# Patient Record
Sex: FEMALE | HISPANIC OR LATINO | Employment: UNEMPLOYED | URBAN - METROPOLITAN AREA
[De-identification: names, ages, dates, MRNs, and addresses within clinical notes are randomized per-mention and may not be internally consistent; named-entity substitution may affect disease eponyms.]

---

## 2018-11-28 ENCOUNTER — APPOINTMENT (OUTPATIENT)
Dept: RADIOLOGY | Facility: MEDICAL CENTER | Age: 33
End: 2018-11-28

## 2018-11-28 ENCOUNTER — APPOINTMENT (OUTPATIENT)
Dept: RADIOLOGY | Facility: MEDICAL CENTER | Age: 33
End: 2018-11-28
Attending: EMERGENCY MEDICINE

## 2018-11-28 ENCOUNTER — HOSPITAL ENCOUNTER (EMERGENCY)
Facility: MEDICAL CENTER | Age: 33
End: 2018-11-28
Attending: EMERGENCY MEDICINE

## 2018-11-28 VITALS
HEART RATE: 91 BPM | OXYGEN SATURATION: 99 % | DIASTOLIC BLOOD PRESSURE: 71 MMHG | HEIGHT: 64 IN | TEMPERATURE: 98.2 F | SYSTOLIC BLOOD PRESSURE: 98 MMHG | BODY MASS INDEX: 23.34 KG/M2 | WEIGHT: 136.69 LBS | RESPIRATION RATE: 18 BRPM

## 2018-11-28 DIAGNOSIS — R07.9 CHEST PAIN, UNSPECIFIED TYPE: ICD-10-CM

## 2018-11-28 LAB
ALBUMIN SERPL BCP-MCNC: 3.9 G/DL (ref 3.2–4.9)
ALBUMIN/GLOB SERPL: 1.3 G/DL
ALP SERPL-CCNC: 71 U/L (ref 30–99)
ALT SERPL-CCNC: 11 U/L (ref 2–50)
ANION GAP SERPL CALC-SCNC: 7 MMOL/L (ref 0–11.9)
APTT PPP: 29 SEC (ref 24.7–36)
AST SERPL-CCNC: 15 U/L (ref 12–45)
BASOPHILS # BLD AUTO: 0.5 % (ref 0–1.8)
BASOPHILS # BLD: 0.05 K/UL (ref 0–0.12)
BILIRUB SERPL-MCNC: 0.3 MG/DL (ref 0.1–1.5)
BNP SERPL-MCNC: 3 PG/ML (ref 0–100)
BUN SERPL-MCNC: 11 MG/DL (ref 8–22)
CALCIUM SERPL-MCNC: 9.1 MG/DL (ref 8.5–10.5)
CHLORIDE SERPL-SCNC: 107 MMOL/L (ref 96–112)
CO2 SERPL-SCNC: 24 MMOL/L (ref 20–33)
CREAT SERPL-MCNC: 0.75 MG/DL (ref 0.5–1.4)
EKG IMPRESSION: NORMAL
EOSINOPHIL # BLD AUTO: 0.09 K/UL (ref 0–0.51)
EOSINOPHIL NFR BLD: 0.9 % (ref 0–6.9)
ERYTHROCYTE [DISTWIDTH] IN BLOOD BY AUTOMATED COUNT: 40.6 FL (ref 35.9–50)
GLOBULIN SER CALC-MCNC: 3 G/DL (ref 1.9–3.5)
GLUCOSE SERPL-MCNC: 91 MG/DL (ref 65–99)
HCG SERPL QL: NEGATIVE
HCT VFR BLD AUTO: 38.1 % (ref 37–47)
HGB BLD-MCNC: 12.8 G/DL (ref 12–16)
IMM GRANULOCYTES # BLD AUTO: 0.03 K/UL (ref 0–0.11)
IMM GRANULOCYTES NFR BLD AUTO: 0.3 % (ref 0–0.9)
INR PPP: 1.08 (ref 0.87–1.13)
LIPASE SERPL-CCNC: 24 U/L (ref 11–82)
LYMPHOCYTES # BLD AUTO: 2.48 K/UL (ref 1–4.8)
LYMPHOCYTES NFR BLD: 24.1 % (ref 22–41)
MCH RBC QN AUTO: 30.7 PG (ref 27–33)
MCHC RBC AUTO-ENTMCNC: 33.6 G/DL (ref 33.6–35)
MCV RBC AUTO: 91.4 FL (ref 81.4–97.8)
MONOCYTES # BLD AUTO: 0.49 K/UL (ref 0–0.85)
MONOCYTES NFR BLD AUTO: 4.8 % (ref 0–13.4)
NEUTROPHILS # BLD AUTO: 7.17 K/UL (ref 2–7.15)
NEUTROPHILS NFR BLD: 69.4 % (ref 44–72)
NRBC # BLD AUTO: 0 K/UL
NRBC BLD-RTO: 0 /100 WBC
PLATELET # BLD AUTO: 499 K/UL (ref 164–446)
PMV BLD AUTO: 9 FL (ref 9–12.9)
POTASSIUM SERPL-SCNC: 3.5 MMOL/L (ref 3.6–5.5)
PROT SERPL-MCNC: 6.9 G/DL (ref 6–8.2)
PROTHROMBIN TIME: 14.1 SEC (ref 12–14.6)
RBC # BLD AUTO: 4.17 M/UL (ref 4.2–5.4)
SODIUM SERPL-SCNC: 138 MMOL/L (ref 135–145)
TROPONIN I SERPL-MCNC: <0.01 NG/ML (ref 0–0.04)
WBC # BLD AUTO: 10.3 K/UL (ref 4.8–10.8)

## 2018-11-28 PROCEDURE — 99284 EMERGENCY DEPT VISIT MOD MDM: CPT

## 2018-11-28 PROCEDURE — 85025 COMPLETE CBC W/AUTO DIFF WBC: CPT

## 2018-11-28 PROCEDURE — 80053 COMPREHEN METABOLIC PANEL: CPT

## 2018-11-28 PROCEDURE — 84484 ASSAY OF TROPONIN QUANT: CPT

## 2018-11-28 PROCEDURE — 84703 CHORIONIC GONADOTROPIN ASSAY: CPT

## 2018-11-28 PROCEDURE — 85610 PROTHROMBIN TIME: CPT

## 2018-11-28 PROCEDURE — 83690 ASSAY OF LIPASE: CPT

## 2018-11-28 PROCEDURE — 36415 COLL VENOUS BLD VENIPUNCTURE: CPT

## 2018-11-28 PROCEDURE — 700102 HCHG RX REV CODE 250 W/ 637 OVERRIDE(OP): Performed by: EMERGENCY MEDICINE

## 2018-11-28 PROCEDURE — 93005 ELECTROCARDIOGRAM TRACING: CPT | Performed by: EMERGENCY MEDICINE

## 2018-11-28 PROCEDURE — A9270 NON-COVERED ITEM OR SERVICE: HCPCS | Performed by: EMERGENCY MEDICINE

## 2018-11-28 PROCEDURE — 85730 THROMBOPLASTIN TIME PARTIAL: CPT

## 2018-11-28 PROCEDURE — 83880 ASSAY OF NATRIURETIC PEPTIDE: CPT

## 2018-11-28 PROCEDURE — 71045 X-RAY EXAM CHEST 1 VIEW: CPT

## 2018-11-28 RX ORDER — OMEPRAZOLE 20 MG/1
20 CAPSULE, DELAYED RELEASE ORAL DAILY
Qty: 30 CAP | Refills: 0 | Status: SHIPPED | OUTPATIENT
Start: 2018-11-28

## 2018-11-28 RX ADMIN — LIDOCAINE HYDROCHLORIDE 30 ML: 20 SOLUTION OROPHARYNGEAL at 18:34

## 2018-11-28 ASSESSMENT — PAIN SCALES - GENERAL
PAINLEVEL_OUTOF10: 9
PAINLEVEL_OUTOF10: 2

## 2018-11-28 ASSESSMENT — LIFESTYLE VARIABLES: DO YOU DRINK ALCOHOL: NO

## 2018-11-29 NOTE — ED NOTES
Pt discharged at this time with family. Pt verbalized understanding of discharge instructions and follow up at this time. Pt ambulated out with steady gait.

## 2018-11-29 NOTE — ED PROVIDER NOTES
ED Provider Note    CHIEF COMPLAINT  Chief Complaint   Patient presents with   • Chest Pain       HPI  Amaya Fox is a 33 y.o. female who presents for evaluation of chest pain which started around 10:00 this morning.  Patient states the pain is in her inferior sternal area and radiates to her back.  It does not radiate to her jaw or arm, and it is not associated with sweating, nausea, or vomiting.  Patient notes she has had this several times in the past few years but does not normally see a doctor and has not been diagnosed.  She is on no medications at this time aside from omeprazole and occasional Motrin.    REVIEW OF SYSTEMS  Constitutional: No fevers or chills  Skin: No rashes  HEENT: No sore throat, runny nose, sores, trouble swallowing, trouble speaking.  Neck: No neck pain, stiffness, or masses.  Chest: No rashes  Pulm: No shortness of breath, cough, wheezing, stridor, or pain with inspiration/expiration  Gastrointestinal: No nausea, vomiting, diarrhea, constipation  Genitourinary: No dysuria or hematuria  Musculoskeletal: No recent trauma, pain, swelling, weakness  Heme: No bleeding or bruising problems.   Immuno: No hx of recurrent infections      PAST MEDICAL HISTORY   has a past medical history of Abnormal Pap smear - Colpo/Bx 2/10/10 (2010); Anemia (2010); ASTHMA; History of  x2 (2010); History of drug abuse (2010); and SUPERVISION OF HIGH-RISK PREGNANCY (2010).    SOCIAL HISTORY  Social History     Social History Main Topics   • Smoking status: Current Every Day Smoker     Packs/day: 1.50     Last attempt to quit: 2009   • Smokeless tobacco: Never Used   • Alcohol use No   • Drug use: No      Comment: Not since    • Sexual activity: Yes     Partners: Male       SURGICAL HISTORY   has a past surgical history that includes c-sec+postpartum care,prev c-sec; primary c section; repeat c section w tubal ligation (2010);  delivery only; tubal ligation;  "and jaki by laparoscopy (8/19/2010).    CURRENT MEDICATIONS  Home Medications     Reviewed by Hilary Delcid R.N. (Registered Nurse) on 11/28/18 at 1648  Med List Status: Not Addressed   Medication Last Dose Status   ibuprofen (MOTRIN) 600 MG Tab  Active   omeprazole (PRILOSEC) 20 MG delayed-release capsule  Active                ALLERGIES  No Known Allergies    PHYSICAL EXAM  VITAL SIGNS: BP (!) 98/71   Pulse 91   Temp 36.8 °C (98.2 °F) (Temporal)   Resp 18   Ht 1.626 m (5' 4\")   Wt 62 kg (136 lb 11 oz)   SpO2 99%   BMI 23.46 kg/m²    Gen: Alert in no apparent distress.  Calm, smiling  HEENT: No signs of trauma, Bilateral external ears normal, Nose normal. Conjunctiva normal, Non-icteric.   Neck:  No tenderness, Supple, No masses  Lymphatic: No cervical lymphadenopathy noted.   Cardiovascular: Regular rate and rhythm, no murmurs.  Capillary refill less than 3 seconds to all extremities, 2+ distal pulses to radial and dorsalis pedis arteries, skin warm and dry to extremities  Thorax & Lungs: Normal breath sounds, No respiratory distress, No wheezing bilateral chest rise  Abdomen: Bowel sounds normal, Soft, No tenderness, No masses, No pulsatile masses. No Guarding or rebound  Skin: Warm, Dry, No erythema, No rash.   Extremities: Intact distal pulses, No edema  Neurologic: Alert , no facial droop, grossly normal coordination and strength  Psychiatric: Affect normal, Judgment normal, Mood normal.       INITIAL IMPRESSION  Patient has symptoms suggestive of gastric or esophageal pain however will attempt to rule out ACS as cause.  Patient has no risk factors for pulmonary embolism and is noted to be PERC negative.  She does not appear septic or toxic and has no recent infectious prodrome.  She has had no recent symptoms to suggest gastrointestinal bleeding and has no abdominal pain to suggest perforation.    LABS  Results for orders placed or performed during the hospital encounter of 11/28/18   Troponin "   Result Value Ref Range    Troponin I <0.01 0.00 - 0.04 ng/mL   Btype Natriuretic Peptide   Result Value Ref Range    B Natriuretic Peptide 3 0 - 100 pg/mL   CBC with Differential   Result Value Ref Range    WBC 10.3 4.8 - 10.8 K/uL    RBC 4.17 (L) 4.20 - 5.40 M/uL    Hemoglobin 12.8 12.0 - 16.0 g/dL    Hematocrit 38.1 37.0 - 47.0 %    MCV 91.4 81.4 - 97.8 fL    MCH 30.7 27.0 - 33.0 pg    MCHC 33.6 33.6 - 35.0 g/dL    RDW 40.6 35.9 - 50.0 fL    Platelet Count 499 (H) 164 - 446 K/uL    MPV 9.0 9.0 - 12.9 fL    Neutrophils-Polys 69.40 44.00 - 72.00 %    Lymphocytes 24.10 22.00 - 41.00 %    Monocytes 4.80 0.00 - 13.40 %    Eosinophils 0.90 0.00 - 6.90 %    Basophils 0.50 0.00 - 1.80 %    Immature Granulocytes 0.30 0.00 - 0.90 %    Nucleated RBC 0.00 /100 WBC    Neutrophils (Absolute) 7.17 (H) 2.00 - 7.15 K/uL    Lymphs (Absolute) 2.48 1.00 - 4.80 K/uL    Monos (Absolute) 0.49 0.00 - 0.85 K/uL    Eos (Absolute) 0.09 0.00 - 0.51 K/uL    Baso (Absolute) 0.05 0.00 - 0.12 K/uL    Immature Granulocytes (abs) 0.03 0.00 - 0.11 K/uL    NRBC (Absolute) 0.00 K/uL   Complete Metabolic Panel (CMP)   Result Value Ref Range    Sodium 138 135 - 145 mmol/L    Potassium 3.5 (L) 3.6 - 5.5 mmol/L    Chloride 107 96 - 112 mmol/L    Co2 24 20 - 33 mmol/L    Anion Gap 7.0 0.0 - 11.9    Glucose 91 65 - 99 mg/dL    Bun 11 8 - 22 mg/dL    Creatinine 0.75 0.50 - 1.40 mg/dL    Calcium 9.1 8.5 - 10.5 mg/dL    AST(SGOT) 15 12 - 45 U/L    ALT(SGPT) 11 2 - 50 U/L    Alkaline Phosphatase 71 30 - 99 U/L    Total Bilirubin 0.3 0.1 - 1.5 mg/dL    Albumin 3.9 3.2 - 4.9 g/dL    Total Protein 6.9 6.0 - 8.2 g/dL    Globulin 3.0 1.9 - 3.5 g/dL    A-G Ratio 1.3 g/dL   Prothrombin Time   Result Value Ref Range    PT 14.1 12.0 - 14.6 sec    INR 1.08 0.87 - 1.13   APTT   Result Value Ref Range    APTT 29.0 24.7 - 36.0 sec   Lipase   Result Value Ref Range    Lipase 24 11 - 82 U/L   HCG QUAL SERUM   Result Value Ref Range    Beta-Hcg Qualitative Serum Negative  Negative   ESTIMATED GFR   Result Value Ref Range    GFR If African American >60 >60 mL/min/1.73 m 2    GFR If Non African American >60 >60 mL/min/1.73 m 2   EKG   Result Value Ref Range    Report       [facility] Emergency Dept.    Test Date:  2018  Pt Name:    MARBELLA GEORGES              Department: ER  MRN:        [id number]                      Room:       [facility]  Gender:     Female                       Technician: GERARDO  :        1985                   Requested By:ER TRIAGE PROTOCOL  Order #:    [id number]                    Reading MD:    Measurements  Intervals                                Axis  Rate:       87                           P:          67  NY:         140                          QRS:        34  QRSD:       86                           T:          20  QT:         372  QTc:        448    Interpretive Statements  SINUS RHYTHM  Compared to ECG 2016 12:49:53  No significant changes         RADIOLOGY  DX-CHEST-LIMITED (1 VIEW)   Final Result      No evidence of acute cardiopulmonary process.        Reevaluation   Time:6:52 PM  Vital signs:   Assessment: Patient smiling, conversant, just finished off a [employer] hamburger      COURSE & MEDICAL DECISION MAKING  Pertinent Labs & Imaging studies reviewed. (See chart for details)  Patient arrives for evaluation of chest pain which is likely related to an esophageal or gastric issue.  There are no findings to suggest ischemia and patient was low risk for pulmonary embolism and dissection.  She did not appear septic or toxic and had no other findings to suggest a mediastinal, cardiac, or pulmonary infectious/inflammatory issue.  She did get decent relief of her symptoms after GI cocktail and was noted to be eating a [employer] hamburger on reevaluation.  I did not feel the patient would benefit from inpatient rule out as her heart score is 0 and I felt she could be safely evaluated as an outpatient if her primary care  physician felt it was necessary.  In the meantime, the patient will be given omeprazole for symptomatic treatment.  She stated clear understanding that if her symptoms worsen or change, she should return for immediate reevaluation.    FINAL IMPRESSION  1. Chest pain, unspecified type        Electronically signed by: James Walls, 11/28/2018 5:06 PM

## 2018-11-29 NOTE — ED TRIAGE NOTES
Pt BIB EMS for chest pain. Pt reporting SOB with the chest pain. Pt reporting it started this morning at 1000. Pt denies taking any medications in the past.

## 2019-07-18 ENCOUNTER — HOSPITAL ENCOUNTER (EMERGENCY)
Facility: MEDICAL CENTER | Age: 34
End: 2019-07-18
Attending: EMERGENCY MEDICINE

## 2019-07-18 VITALS
HEART RATE: 95 BPM | WEIGHT: 112.88 LBS | OXYGEN SATURATION: 98 % | HEIGHT: 60 IN | RESPIRATION RATE: 16 BRPM | TEMPERATURE: 97.7 F | BODY MASS INDEX: 22.16 KG/M2 | SYSTOLIC BLOOD PRESSURE: 112 MMHG | DIASTOLIC BLOOD PRESSURE: 75 MMHG

## 2019-07-18 DIAGNOSIS — A59.9 TRICHIMONIASIS: ICD-10-CM

## 2019-07-18 DIAGNOSIS — R59.1 LYMPHADENOPATHY: ICD-10-CM

## 2019-07-18 LAB
ANION GAP SERPL CALC-SCNC: 6 MMOL/L (ref 0–11.9)
APPEARANCE UR: ABNORMAL
BACTERIA #/AREA URNS HPF: ABNORMAL /HPF
BACTERIA GENITAL QL WET PREP: NORMAL
BASOPHILS # BLD AUTO: 0.6 % (ref 0–1.8)
BASOPHILS # BLD: 0.07 K/UL (ref 0–0.12)
BILIRUB UR QL STRIP.AUTO: NEGATIVE
BUN SERPL-MCNC: 11 MG/DL (ref 8–22)
CALCIUM SERPL-MCNC: 8.7 MG/DL (ref 8.5–10.5)
CAOX CRY #/AREA URNS HPF: ABNORMAL /HPF
CHLORIDE SERPL-SCNC: 108 MMOL/L (ref 96–112)
CO2 SERPL-SCNC: 26 MMOL/L (ref 20–33)
COLOR UR: ABNORMAL
CREAT SERPL-MCNC: 0.77 MG/DL (ref 0.5–1.4)
EOSINOPHIL # BLD AUTO: 0.22 K/UL (ref 0–0.51)
EOSINOPHIL NFR BLD: 2 % (ref 0–6.9)
EPI CELLS #/AREA URNS HPF: ABNORMAL /HPF
ERYTHROCYTE [DISTWIDTH] IN BLOOD BY AUTOMATED COUNT: 39.4 FL (ref 35.9–50)
GLUCOSE SERPL-MCNC: 82 MG/DL (ref 65–99)
GLUCOSE UR STRIP.AUTO-MCNC: NEGATIVE MG/DL
HCG SERPL QL: NEGATIVE
HCT VFR BLD AUTO: 35.4 % (ref 37–47)
HGB BLD-MCNC: 11.5 G/DL (ref 12–16)
HYALINE CASTS #/AREA URNS LPF: ABNORMAL /LPF
IMM GRANULOCYTES # BLD AUTO: 0.03 K/UL (ref 0–0.11)
IMM GRANULOCYTES NFR BLD AUTO: 0.3 % (ref 0–0.9)
KETONES UR STRIP.AUTO-MCNC: NEGATIVE MG/DL
LEUKOCYTE ESTERASE UR QL STRIP.AUTO: ABNORMAL
LYMPHOCYTES # BLD AUTO: 2.55 K/UL (ref 1–4.8)
LYMPHOCYTES NFR BLD: 22.8 % (ref 22–41)
MCH RBC QN AUTO: 28.8 PG (ref 27–33)
MCHC RBC AUTO-ENTMCNC: 32.5 G/DL (ref 33.6–35)
MCV RBC AUTO: 88.5 FL (ref 81.4–97.8)
MICRO URNS: ABNORMAL
MONOCYTES # BLD AUTO: 0.9 K/UL (ref 0–0.85)
MONOCYTES NFR BLD AUTO: 8.1 % (ref 0–13.4)
MUCOUS THREADS #/AREA URNS HPF: ABNORMAL /HPF
NEUTROPHILS # BLD AUTO: 7.4 K/UL (ref 2–7.15)
NEUTROPHILS NFR BLD: 66.2 % (ref 44–72)
NITRITE UR QL STRIP.AUTO: NEGATIVE
NRBC # BLD AUTO: 0 K/UL
NRBC BLD-RTO: 0 /100 WBC
PH UR STRIP.AUTO: 5.5 [PH]
PLATELET # BLD AUTO: 558 K/UL (ref 164–446)
PMV BLD AUTO: 8.4 FL (ref 9–12.9)
POTASSIUM SERPL-SCNC: 3.2 MMOL/L (ref 3.6–5.5)
PROT UR QL STRIP: NEGATIVE MG/DL
RBC # BLD AUTO: 4 M/UL (ref 4.2–5.4)
RBC # URNS HPF: ABNORMAL /HPF
RBC UR QL AUTO: ABNORMAL
SIGNIFICANT IND 70042: NORMAL
SITE SITE: NORMAL
SODIUM SERPL-SCNC: 140 MMOL/L (ref 135–145)
SOURCE SOURCE: NORMAL
SP GR UR STRIP.AUTO: 1.03
UROBILINOGEN UR STRIP.AUTO-MCNC: 1 MG/DL
WBC # BLD AUTO: 11.2 K/UL (ref 4.8–10.8)
WBC #/AREA URNS HPF: ABNORMAL /HPF

## 2019-07-18 PROCEDURE — 85025 COMPLETE CBC W/AUTO DIFF WBC: CPT

## 2019-07-18 PROCEDURE — 80048 BASIC METABOLIC PNL TOTAL CA: CPT

## 2019-07-18 PROCEDURE — 81001 URINALYSIS AUTO W/SCOPE: CPT

## 2019-07-18 PROCEDURE — 700111 HCHG RX REV CODE 636 W/ 250 OVERRIDE (IP): Performed by: EMERGENCY MEDICINE

## 2019-07-18 PROCEDURE — 99284 EMERGENCY DEPT VISIT MOD MDM: CPT

## 2019-07-18 PROCEDURE — 87186 SC STD MICRODIL/AGAR DIL: CPT

## 2019-07-18 PROCEDURE — 87491 CHLMYD TRACH DNA AMP PROBE: CPT

## 2019-07-18 PROCEDURE — 96372 THER/PROPH/DIAG INJ SC/IM: CPT

## 2019-07-18 PROCEDURE — A9270 NON-COVERED ITEM OR SERVICE: HCPCS | Performed by: EMERGENCY MEDICINE

## 2019-07-18 PROCEDURE — 84703 CHORIONIC GONADOTROPIN ASSAY: CPT

## 2019-07-18 PROCEDURE — 87591 N.GONORRHOEAE DNA AMP PROB: CPT

## 2019-07-18 PROCEDURE — 36415 COLL VENOUS BLD VENIPUNCTURE: CPT

## 2019-07-18 PROCEDURE — 700102 HCHG RX REV CODE 250 W/ 637 OVERRIDE(OP): Performed by: EMERGENCY MEDICINE

## 2019-07-18 PROCEDURE — 87086 URINE CULTURE/COLONY COUNT: CPT

## 2019-07-18 PROCEDURE — 87077 CULTURE AEROBIC IDENTIFY: CPT | Mod: 91

## 2019-07-18 PROCEDURE — 700101 HCHG RX REV CODE 250: Performed by: EMERGENCY MEDICINE

## 2019-07-18 RX ORDER — METRONIDAZOLE 500 MG/1
500 TABLET ORAL ONCE
Status: COMPLETED | OUTPATIENT
Start: 2019-07-18 | End: 2019-07-18

## 2019-07-18 RX ORDER — DOXYCYCLINE 100 MG/1
100 CAPSULE ORAL 2 TIMES DAILY
Qty: 14 CAP | Refills: 0 | Status: SHIPPED | OUTPATIENT
Start: 2019-07-18 | End: 2019-07-25

## 2019-07-18 RX ORDER — CEFTRIAXONE SODIUM 250 MG/1
250 INJECTION, POWDER, FOR SOLUTION INTRAMUSCULAR; INTRAVENOUS ONCE
Status: COMPLETED | OUTPATIENT
Start: 2019-07-18 | End: 2019-07-18

## 2019-07-18 RX ORDER — METRONIDAZOLE 500 MG/1
500 TABLET ORAL 3 TIMES DAILY
Qty: 21 TAB | Refills: 0 | Status: SHIPPED | OUTPATIENT
Start: 2019-07-18 | End: 2019-07-25

## 2019-07-18 RX ORDER — DOXYCYCLINE 100 MG/1
100 TABLET ORAL ONCE
Status: COMPLETED | OUTPATIENT
Start: 2019-07-18 | End: 2019-07-18

## 2019-07-18 RX ADMIN — CEFTRIAXONE SODIUM 250 MG: 250 INJECTION, POWDER, FOR SOLUTION INTRAMUSCULAR; INTRAVENOUS at 08:10

## 2019-07-18 RX ADMIN — LIDOCAINE HYDROCHLORIDE 0.9 ML: 10 INJECTION, SOLUTION INFILTRATION; PERINEURAL at 08:10

## 2019-07-18 RX ADMIN — DOXYCYCLINE 100 MG: 100 TABLET, FILM COATED ORAL at 08:11

## 2019-07-18 RX ADMIN — METRONIDAZOLE 500 MG: 500 TABLET, FILM COATED ORAL at 08:11

## 2019-07-18 NOTE — ED NOTES
"Pt report swollen \"lumps\" in her bilateral groins. She states they have been present for 6 months and gotten progressively worse/larger, and more painful.   "

## 2019-07-18 NOTE — ED TRIAGE NOTES
Chief Complaint   Patient presents with   • Lump     x4, x2 on either side of groin     /72   Pulse 90   Temp 36.3 °C (97.3 °F) (Temporal)   Resp 20   Ht 1.524 m (5')   Wt 51.2 kg (112 lb 14 oz)   SpO2 98%     Pt ambulates with a steady gait to triage c/o x4 painful lumps that have gotten progressively bigger over the last 6 months.

## 2019-07-18 NOTE — LETTER
7/22/2019               Amaya Fox  [address] [address]        Dear Amaya (MR#[id number])    This letter is sent in regards to your, recent visit to the [facility] Emergency Department on 7/18/2019.  During the visit, tests were performed to assist the physician in a medical diagnosis.  A review of those tests requires that we notify you of the following:    Your urine culture was POSITIVE for a bacteria called Escherichia coli. The antibiotic prescribed for you (doxycycline) should be active to treat this bacteria. IT IS IMPORTANT THAT YOU CONTINUE TAKING YOUR ANTIBIOTIC UNTIL IT IS FINISHED.      Please feel free to contact me at the number below if you have any questions or concerns. Thank you for your cooperation in the matter.    Sincerely,  ED Culture Follow-Up Staff  India Knight, PharmD    [facility], Emergency Department  [address] [place] [id number]  [phone] (ED Culture Line)  [phone]

## 2019-07-18 NOTE — ED PROVIDER NOTES
ED Provider Note    CHIEF COMPLAINT  Chief Complaint   Patient presents with   • Lump     x4, x2 on either side of groin       HPI  Amaya Fox is a 33 y.o. female who presents to the emergency department with lumps in her groin.  Bilateral inguinal region.  Started a few months ago.  Seems to be gradually enlarging.  Several bumps on both sides.  No lumps anywhere else.  No overlying rash.  Denies dysuria or hematuria she denies vaginal bleeding.  No abnormal vaginal discharge.  She has not had a fever or chills.  No chest pain or shortness of breath.    REVIEW OF SYSTEMS  As per HPI, otherwise a 10 point review of systems is negative    PAST MEDICAL HISTORY  Past Medical History:   Diagnosis Date   • Abnormal Pap smear - Colpo/Bx 2/10/10 2010   • Anemia 2010   • ASTHMA    • History of  x2 2010   • History of drug abuse 2010   • SUPERVISION OF HIGH-RISK PREGNANCY 2010       SOCIAL HISTORY  Social History   Substance Use Topics   • Smoking status: Current Every Day Smoker     Packs/day: 1.50     Last attempt to quit: 2009   • Smokeless tobacco: Never Used   • Alcohol use No       SURGICAL HISTORY  Past Surgical History:   Procedure Laterality Date   • BONY BY LAPAROSCOPY  2010    Performed by TENNILLE ROBLES at SURGERY [facility]   • REPEAT C SECTION W TUBAL LIGATION  2010    Performed by ALBERTO ASH at LABOR AND DELIVERY   • PB C-SEC+POSTPARTUM CARE,PBEV C-SEC     • PB  DELIVERY ONLY      pt. states  x 3   • PRIMARY C SECTION     • TUBAL LIGATION         CURRENT MEDICATIONS  Home Medications    **Home medications have not yet been reviewed for this encounter**         ALLERGIES  No Known Allergies    PHYSICAL EXAM  VITAL SIGNS: /72   Pulse 90   Temp 36.3 °C (97.3 °F) (Temporal)   Resp 20   Ht 1.524 m (5')   Wt 51.2 kg (112 lb 14 oz)   SpO2 98%   BMI 22.04 kg/m²    Constitutional: Awake and alert  HENT:  Atraumatic,  Normocephalic.Oropharynx moist mucus membranes, Nose normal inspection.   Eyes: Normal inspection  Neck: Supple.  No cervical adenopathy.  Cardiovascular: Normal heart rate, Normal rhythm.  Symmetric peripheral pulses.  No axillary lymphadenopathy or supraclavicular adenopathy.  Thorax & Lungs: No respiratory distress, No wheezing, No rales, No rhonchi, No chest tenderness.   Abdomen: Bowel sounds normal, soft, non-distended, nontender, a few small blueberry sized inguinal lymph nodes bilaterally.  No overlying redness or warmth.  No hernia.  Pelvic Exam:   Normal external genitalia with Normal vulva.  Normal vagina with no polyps or lesions and with minimal thin white discharge.  Cervix is normal in appearance.  Mild pain with range of motion of the cervix.  Skin: Warm, Dry, No rash.   Back: No tenderness  Extremities: Well-perfused  Neurologic: Grossly normal   Psychiatric: Anxious appearing      Labs:  Results for orders placed or performed during the hospital encounter of 07/18/19   CBC WITH DIFFERENTIAL   Result Value Ref Range    WBC 11.2 (H) 4.8 - 10.8 K/uL    RBC 4.00 (L) 4.20 - 5.40 M/uL    Hemoglobin 11.5 (L) 12.0 - 16.0 g/dL    Hematocrit 35.4 (L) 37.0 - 47.0 %    MCV 88.5 81.4 - 97.8 fL    MCH 28.8 27.0 - 33.0 pg    MCHC 32.5 (L) 33.6 - 35.0 g/dL    RDW 39.4 35.9 - 50.0 fL    Platelet Count 558 (H) 164 - 446 K/uL    MPV 8.4 (L) 9.0 - 12.9 fL    Neutrophils-Polys 66.20 44.00 - 72.00 %    Lymphocytes 22.80 22.00 - 41.00 %    Monocytes 8.10 0.00 - 13.40 %    Eosinophils 2.00 0.00 - 6.90 %    Basophils 0.60 0.00 - 1.80 %    Immature Granulocytes 0.30 0.00 - 0.90 %    Nucleated RBC 0.00 /100 WBC    Neutrophils (Absolute) 7.40 (H) 2.00 - 7.15 K/uL    Lymphs (Absolute) 2.55 1.00 - 4.80 K/uL    Monos (Absolute) 0.90 (H) 0.00 - 0.85 K/uL    Eos (Absolute) 0.22 0.00 - 0.51 K/uL    Baso (Absolute) 0.07 0.00 - 0.12 K/uL    Immature Granulocytes (abs) 0.03 0.00 - 0.11 K/uL    NRBC (Absolute) 0.00 K/uL   BASIC  METABOLIC PANEL   Result Value Ref Range    Sodium 140 135 - 145 mmol/L    Potassium 3.2 (L) 3.6 - 5.5 mmol/L    Chloride 108 96 - 112 mmol/L    Co2 26 20 - 33 mmol/L    Glucose 82 65 - 99 mg/dL    Bun 11 8 - 22 mg/dL    Creatinine 0.77 0.50 - 1.40 mg/dL    Calcium 8.7 8.5 - 10.5 mg/dL    Anion Gap 6.0 0.0 - 11.9   URINALYSIS CULTURE, IF INDICATED   Result Value Ref Range    Color DK Yellow     Character Cloudy (A)     Specific Gravity 1.035 <1.035    Ph 5.5 5.0 - 8.0    Glucose Negative Negative mg/dL    Ketones Negative Negative mg/dL    Protein Negative Negative mg/dL    Bilirubin Negative Negative    Urobilinogen, Urine 1.0 Negative    Nitrite Negative Negative    Leukocyte Esterase Moderate (A) Negative    Occult Blood Small (A) Negative    Micro Urine Req Microscopic    WET PREP   Result Value Ref Range    Significant Indicator NEG     Source GEN     Site VAGINAL     Wet Prep For Parasites       Many motile Trichomonas seen.  Many WBCs seen.  No yeast.  No clue cells seen.     CHLAMYDIA & GC BY PCR   Result Value Ref Range    Source Other    HCG QUAL SERUM   Result Value Ref Range    Beta-Hcg Qualitative Serum Negative Negative   ESTIMATED GFR   Result Value Ref Range    GFR If African American >60 >60 mL/min/1.73 m 2    GFR If Non African American >60 >60 mL/min/1.73 m 2   URINE MICROSCOPIC (W/UA)   Result Value Ref Range    WBC 20-50 (A) /hpf    RBC 2-5 (A) /hpf    Bacteria Moderate (A) None /hpf    Epithelial Cells Few /hpf    Mucous Threads Many /hpf    Ca Oxalate Crystal Few /hpf    Hyaline Cast 3-5 (A) /lpf       Medications   cefTRIAXone (ROCEPHIN) injection 250 mg (250 mg Intramuscular Given 7/18/19 0810)   metroNIDAZOLE (FLAGYL) tablet 500 mg (500 mg Oral Given 7/18/19 0811)   doxycycline monohydrate (ADOXA) tablet 100 mg (100 mg Oral Given 7/18/19 0811)   lidocaine (XYLOCAINE) 1 % injection 0.9-2.1 mL (0.9 mL Other Given 7/18/19 0810)           COURSE & MEDICAL DECISION MAKING  Patient presents with  inguinal lymphadenopathy.  Differential is broad.  Obtain laboratory data.  CBC shows WBC count of 11.2.  Hemoglobin 11.  Platelets normal.  BMP sodium 3.2 otherwise unremarkable.  Wet prep returned positive for trichomonas.  She does not have dysuria but did have discharge.  She was noted to have white cells in her urine I suspect this is from vaginal infection rather than urinary tract infection.  With her inguinal lymph nodes I do worry about other more rare infection such as lymphogranuloma or granuloma in.  Patient will be treated for PID with ceftriaxone and Flagyl.  She will be given doxycycline.  She will take these antibiotic for 1 week.  If she has persistent enlarged lymph nodes she needs to follow-up at the [facility] for further assessment.  If she has fevers, dysuria, flank pain or other symptoms return to the ER.    FINAL IMPRESSION  1.  Inguinal lymphadenopathy  2.  Suspected pelvic inflammatory disease  3.  Trichomoniasis      This dictation was created using voice recognition software. The accuracy of the dictation is limited to the abilities of the software.  The nursing notes were reviewed and certain aspects of this information were incorporated into this note.      Electronically signed by: Donnie Welch, 7/18/2019 8:28 AM

## 2019-07-19 LAB
C TRACH DNA SPEC QL NAA+PROBE: NEGATIVE
N GONORRHOEA DNA SPEC QL NAA+PROBE: NEGATIVE
SPECIMEN SOURCE: NORMAL

## 2019-07-21 LAB
BACTERIA UR CULT: ABNORMAL
SIGNIFICANT IND 70042: ABNORMAL
SITE SITE: ABNORMAL
SOURCE SOURCE: ABNORMAL

## 2019-07-22 NOTE — ED NOTES
ED Positive Culture Follow-up/Notification Note:    Date: 7/22/19     Patient seen in the ED on 7/18/2019 for lumps in her groin.   1. Trichimoniasis    2. Lymphadenopathy      Given Rocephin 250 mg IM, metronidazole 500 mg po and doxycycline 100 mg po in the ER.     Discharge Medication List as of 7/18/2019  8:09 AM      START taking these medications    Details   doxycycline (MONODOX) 100 MG capsule Take 1 Cap by mouth 2 times a day for 7 days., Disp-14 Cap, R-0, Print Rx Paper      metroNIDAZOLE (FLAGYL) 500 MG Tab Take 1 Tab by mouth 3 times a day for 7 days., Disp-21 Tab, R-0, Print Rx Paper             Allergies: Patient has no known allergies.     Vitals:    07/18/19 0610 07/18/19 0612 07/18/19 0828   BP: 109/72  112/75   Pulse: 90  95   Resp: 20  16   Temp: 36.3 °C (97.3 °F)  36.5 °C (97.7 °F)   TempSrc: Temporal  Temporal   SpO2: 98%     Weight:  51.2 kg (112 lb 14 oz)    Height: 1.524 m (5')         Final cultures:   Results     Procedure Component Value Units Date/Time    URINE CULTURE(NEW) [[id number]]  (Abnormal)  (Susceptibility) Collected:  07/18/19 0729    Order Status:  Completed Specimen:  Urine Updated:  07/21/19 1048     Significant Indicator POS (POS)     Source UR     Site -     Culture Result - (A)      Escherichia coli  >100,000 cfu/mL   (A)      Streptococcus agalactiae (Group B)  <10,000 cfu/mL   (A)    Culture & Susceptibility     ESCHERICHIA COLI     Antibiotic Sensitivity Microscan Unit Status    Ampicillin Sensitive <=8 mcg/mL Final    Method: LEONARDO    Cefepime Sensitive <=8 mcg/mL Final    Method: LEONARDO    Cefotaxime Sensitive <=2 mcg/mL Final    Method: LEONARDO    Cefotetan Sensitive <=16 mcg/mL Final    Method: LEONARDO    Ceftazidime Sensitive <=1 mcg/mL Final    Method: LEONARDO    Ceftriaxone Sensitive <=8 mcg/mL Final    Method: LEONARDO    Cefuroxime Sensitive <=4 mcg/mL Final    Method: LEONARDO    Cephalothin Sensitive <=8 mcg/mL Final    Method: LEONARDO    Ciprofloxacin Sensitive <=1 mcg/mL Final    Method:  LEONARDO    Gentamicin Sensitive <=4 mcg/mL Final    Method: LEONARDO    Levofloxacin Sensitive <=2 mcg/mL Final    Method: LEONARDO    Nitrofurantoin Sensitive <=32 mcg/mL Final    Method: LEONARDO    Pip/Tazobactam Sensitive <=16 mcg/mL Final    Method: LEONARDO    Piperacillin Sensitive <=16 mcg/mL Final    Method: LEONARDO    Tigecycline Sensitive <=2 mcg/mL Final    Method: LEONARDO    Tobramycin Sensitive <=4 mcg/mL Final    Method: LEONARDO    Trimeth/Sulfa Sensitive <=2/38 mcg/mL Final    Method: LEONARDO                       CHLAMYDIA & GC BY PCR [[id number]] Collected:  07/18/19 0729    Order Status:  Completed Specimen:  Urine from Genital Updated:  07/19/19 2228     C. trachomatis by PCR Negative     N. gonorrhoeae by PCR Negative     Source Other    URINALYSIS CULTURE, IF INDICATED [[id number]]  (Abnormal) Collected:  07/18/19 0705    Order Status:  Completed Specimen:  Urine Updated:  07/18/19 0805     Color DK Yellow     Character Cloudy (A)     Specific Gravity 1.035     Ph 5.5     Glucose Negative mg/dL      Ketones Negative mg/dL      Protein Negative mg/dL      Bilirubin Negative     Urobilinogen, Urine 1.0     Nitrite Negative     Leukocyte Esterase Moderate (A)     Occult Blood Small (A)     Micro Urine Req Microscopic    WET PREP [[id number]] Collected:  07/18/19 0729    Order Status:  Completed Specimen:  Genital from Vaginal Updated:  07/18/19 0751     Significant Indicator NEG     Source GEN     Site VAGINAL     Wet Prep For Parasites Many motile Trichomonas seen.  Many WBCs seen.  No yeast.  No clue cells seen.      Narrative:       CALL  [facility]  ER tel. ,  CALLED  ER tel.  07/18/2019, 07:51, RB PERF. RESULTS CALLED TO:[id number] RN          Plan:   Appropriate antibiotic therapy prescribed to treat trichomonas. Doxycycline will also cover pan sensitive E. Coli in the urine. No changes required based upon culture result.  Sent letter to patient to notify of positive culture result and encourage compliance with prescribed antibiotics.      India Knight

## 2020-02-28 ENCOUNTER — HOSPITAL ENCOUNTER (EMERGENCY)
Facility: MEDICAL CENTER | Age: 35
End: 2020-02-28
Attending: EMERGENCY MEDICINE
Payer: MEDICAID

## 2020-02-28 ENCOUNTER — APPOINTMENT (OUTPATIENT)
Dept: RADIOLOGY | Facility: MEDICAL CENTER | Age: 35
End: 2020-02-28
Attending: EMERGENCY MEDICINE
Payer: MEDICAID

## 2020-02-28 VITALS
TEMPERATURE: 97.7 F | OXYGEN SATURATION: 100 % | HEIGHT: 63 IN | BODY MASS INDEX: 21.48 KG/M2 | RESPIRATION RATE: 15 BRPM | WEIGHT: 121.25 LBS | HEART RATE: 82 BPM | DIASTOLIC BLOOD PRESSURE: 89 MMHG | SYSTOLIC BLOOD PRESSURE: 124 MMHG

## 2020-02-28 DIAGNOSIS — S00.83XA CONTUSION OF FACE, INITIAL ENCOUNTER: ICD-10-CM

## 2020-02-28 DIAGNOSIS — T71.193A ASSAULT BY MANUAL STRANGULATION: ICD-10-CM

## 2020-02-28 DIAGNOSIS — T74.91XA DOMESTIC VIOLENCE OF ADULT, INITIAL ENCOUNTER: ICD-10-CM

## 2020-02-28 DIAGNOSIS — F41.8 SITUATIONAL ANXIETY: ICD-10-CM
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
## 2020-02-28 PROCEDURE — 71045 X-RAY EXAM CHEST 1 VIEW: CPT

## 2020-02-28 PROCEDURE — 99284 EMERGENCY DEPT VISIT MOD MDM: CPT | Mod: 25
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
## 2021-07-26 ENCOUNTER — HOSPITAL ENCOUNTER (EMERGENCY)
Facility: MEDICAL CENTER | Age: 36
End: 2021-07-26

## 2021-07-26 ENCOUNTER — HOSPITAL ENCOUNTER (EMERGENCY)
Facility: MEDICAL CENTER | Age: 36
End: 2021-07-26
Attending: EMERGENCY MEDICINE

## 2021-07-26 VITALS
HEART RATE: 111 BPM | OXYGEN SATURATION: 99 % | DIASTOLIC BLOOD PRESSURE: 78 MMHG | TEMPERATURE: 97.6 F | RESPIRATION RATE: 18 BRPM | SYSTOLIC BLOOD PRESSURE: 117 MMHG

## 2021-07-26 VITALS
TEMPERATURE: 98.6 F | OXYGEN SATURATION: 97 % | SYSTOLIC BLOOD PRESSURE: 113 MMHG | DIASTOLIC BLOOD PRESSURE: 69 MMHG | RESPIRATION RATE: 16 BRPM | HEART RATE: 76 BPM

## 2021-07-26 DIAGNOSIS — F15.10 METHAMPHETAMINE ABUSE (HCC): ICD-10-CM

## 2021-07-26 DIAGNOSIS — Z69.81 PATIENT COUNSELED AS VICTIM OF DOMESTIC VIOLENCE: ICD-10-CM

## 2021-07-26 PROCEDURE — 302449 STATCHG TRIAGE ONLY (STATISTIC)

## 2021-07-26 PROCEDURE — 99284 EMERGENCY DEPT VISIT MOD MDM: CPT

## 2021-07-27 NOTE — ED TRIAGE NOTES
Chief Complaint   Patient presents with   • Suicidal Ideation     x 1 year, denies attempt. Anxiety. Denies plan. Denies HI.      Pt BIB EMS for anxiety. Pt admits to SI denies HI. No plan or previous attempts. Pt tearful in triage.    /78   Pulse (!) 111   Temp 36.4 °C (97.6 °F) (Temporal)   Resp 18   SpO2 99%
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
